# Patient Record
Sex: FEMALE | Race: WHITE | Employment: FULL TIME | URBAN - METROPOLITAN AREA
[De-identification: names, ages, dates, MRNs, and addresses within clinical notes are randomized per-mention and may not be internally consistent; named-entity substitution may affect disease eponyms.]

---

## 2021-04-04 ENCOUNTER — HOSPITAL ENCOUNTER (EMERGENCY)
Facility: CLINIC | Age: 21
Discharge: HOME | End: 2021-04-05
Attending: PHYSICIAN ASSISTANT | Admitting: PHYSICIAN ASSISTANT
Payer: COMMERCIAL

## 2021-04-04 VITALS
OXYGEN SATURATION: 98 % | SYSTOLIC BLOOD PRESSURE: 104 MMHG | RESPIRATION RATE: 18 BRPM | HEART RATE: 68 BPM | DIASTOLIC BLOOD PRESSURE: 48 MMHG | TEMPERATURE: 97 F

## 2021-04-04 DIAGNOSIS — R56.9 WITNESSED SEIZURE-LIKE ACTIVITY (H): ICD-10-CM

## 2021-04-04 DIAGNOSIS — Z86.69 HX OF HYDROCEPHALUS: ICD-10-CM

## 2021-04-04 LAB
ANION GAP SERPL CALCULATED.3IONS-SCNC: 4 MMOL/L (ref 3–14)
B-HCG FREE SERPL-ACNC: <5 IU/L
BASOPHILS # BLD AUTO: 0.1 10E9/L (ref 0–0.2)
BASOPHILS NFR BLD AUTO: 0.6 %
BUN SERPL-MCNC: 15 MG/DL (ref 7–30)
CALCIUM SERPL-MCNC: 8.5 MG/DL (ref 8.5–10.1)
CHLORIDE SERPL-SCNC: 111 MMOL/L (ref 94–109)
CO2 SERPL-SCNC: 25 MMOL/L (ref 20–32)
CREAT SERPL-MCNC: 0.61 MG/DL (ref 0.52–1.04)
DIFFERENTIAL METHOD BLD: NORMAL
EOSINOPHIL # BLD AUTO: 0.1 10E9/L (ref 0–0.7)
EOSINOPHIL NFR BLD AUTO: 1.1 %
ERYTHROCYTE [DISTWIDTH] IN BLOOD BY AUTOMATED COUNT: 12.3 % (ref 10–15)
GFR SERPL CREATININE-BSD FRML MDRD: >90 ML/MIN/{1.73_M2}
GLUCOSE BLDC GLUCOMTR-MCNC: 101 MG/DL (ref 70–99)
GLUCOSE SERPL-MCNC: 99 MG/DL (ref 70–99)
HCT VFR BLD AUTO: 36.9 % (ref 35–47)
HGB BLD-MCNC: 12.3 G/DL (ref 11.7–15.7)
IMM GRANULOCYTES # BLD: 0 10E9/L (ref 0–0.4)
IMM GRANULOCYTES NFR BLD: 0.2 %
LYMPHOCYTES # BLD AUTO: 3.2 10E9/L (ref 0.8–5.3)
LYMPHOCYTES NFR BLD AUTO: 37.1 %
MAGNESIUM SERPL-MCNC: 1.7 MG/DL (ref 1.6–2.3)
MCH RBC QN AUTO: 32.3 PG (ref 26.5–33)
MCHC RBC AUTO-ENTMCNC: 33.3 G/DL (ref 31.5–36.5)
MCV RBC AUTO: 97 FL (ref 78–100)
MONOCYTES # BLD AUTO: 0.8 10E9/L (ref 0–1.3)
MONOCYTES NFR BLD AUTO: 8.6 %
NEUTROPHILS # BLD AUTO: 4.6 10E9/L (ref 1.6–8.3)
NEUTROPHILS NFR BLD AUTO: 52.4 %
NRBC # BLD AUTO: 0 10*3/UL
NRBC BLD AUTO-RTO: 0 /100
PLATELET # BLD AUTO: 257 10E9/L (ref 150–450)
POTASSIUM SERPL-SCNC: 3.6 MMOL/L (ref 3.4–5.3)
RBC # BLD AUTO: 3.81 10E12/L (ref 3.8–5.2)
SODIUM SERPL-SCNC: 140 MMOL/L (ref 133–144)
WBC # BLD AUTO: 8.7 10E9/L (ref 4–11)

## 2021-04-04 PROCEDURE — 85025 COMPLETE CBC W/AUTO DIFF WBC: CPT | Performed by: PHYSICIAN ASSISTANT

## 2021-04-04 PROCEDURE — 93005 ELECTROCARDIOGRAM TRACING: CPT

## 2021-04-04 PROCEDURE — 83735 ASSAY OF MAGNESIUM: CPT | Performed by: PHYSICIAN ASSISTANT

## 2021-04-04 PROCEDURE — 99284 EMERGENCY DEPT VISIT MOD MDM: CPT

## 2021-04-04 PROCEDURE — 999N001017 HC STATISTIC GLUCOSE BY METER IP

## 2021-04-04 PROCEDURE — 80048 BASIC METABOLIC PNL TOTAL CA: CPT | Performed by: PHYSICIAN ASSISTANT

## 2021-04-04 PROCEDURE — 80177 DRUG SCRN QUAN LEVETIRACETAM: CPT | Performed by: PHYSICIAN ASSISTANT

## 2021-04-04 PROCEDURE — 84702 CHORIONIC GONADOTROPIN TEST: CPT

## 2021-04-04 ASSESSMENT — ENCOUNTER SYMPTOMS
CHILLS: 0
TREMORS: 1
DIZZINESS: 1
ABDOMINAL PAIN: 0
NAUSEA: 1
SEIZURES: 1
FEVER: 0

## 2021-04-05 LAB — INTERPRETATION ECG - MUSE: NORMAL

## 2021-04-05 NOTE — ED TRIAGE NOTES
Family reports having seizure like activity that last approx 30-45 min. Now c/o nausea, muscle cramping and fatigue   Hx Hydrocephalus   Prescribed on keppra

## 2021-04-05 NOTE — ED PROVIDER NOTES
History   Chief Complaint:  Seizures     The history is provided by the patient and a friend.      [place] Ontiveros is a 20 year old female who presents with seizures. Patient states she has a history of hydrocephalus with  shunt in place. Patient's friend states she began to have seizure like activity today such as loss of consciousness, tremors in her hands, dizziness, nausea, and cramping which lasted for 30-45 minutes. She did not have any symptoms prior to her seizure. She was laying down and denies falling of head trauma. She took her second dose of 1500 mg of Keppra afterwards but still feels dizziness. She states she was seen approximately a week ago for a seizure in [place] and had her Keppra increased from 1500 to 800 mg. She currently takes 1500 mg x2 daily and has not missed any doses. Patient states she has a history of breakthrough seizures. On her [birth year] last year, she state she had a breakthrough seizure but was conscious for that episode. She complains of continues dizziness but resolves laying still. Her nausea has resolved here. She denies any drug or alcohol use. Denies fevers or chills. Denies abdominal pain. Denies any known pregnancies. She has an EEG coming up in 8 days.      Review of Systems   Constitutional: Negative for chills and fever.   Gastrointestinal: Positive for nausea (resolved). Negative for abdominal pain.   Neurological: Positive for dizziness, tremors (resolved), seizures and syncope.   All other systems reviewed and are negative.      Allergies:  Latex    Medications:  Keppra 1500 mg x2 Daily     Past Medical History:    Hydrocephalus   Breakthrough Seizure    Social History:  Arrives with friend.     Physical Exam     Patient Vitals for the past 24 hrs:   BP Temp Pulse Resp SpO2   04/04/21 2345 104/48 -- 68 -- 98 %   04/04/21 2330 119/64 -- 77 -- 100 %   04/04/21 2315 115/73 -- 81 -- 99 %   04/04/21 2300 121/67 -- 84 -- 100 %   04/04/21 2245 123/61 -- 88 -- 100 %    04/04/21 2230 127/55 -- 80 -- 100 %   04/04/21 2124 (!) 145/74 97  F (36.1  C) 74 18 100 %       Physical Exam  Constitutional: Pleasant. Cooperative.  Eyes: Pupils equally round and reactive  HENT: Head is normal in appearance. Oropharynx is normal with moist mucus membranes.  Cardiovascular: Regular rate and rhythm and without murmurs.  Respiratory: Normal respiratory effort, lungs are clear bilaterally.  GI: Abdomen is soft, non-tender, non-distended. No guarding, rebound, or rigidity.  Skin: Normal, without rash.  Neurologic: Cranial nerves II-XII intact, nl cognition, no focal deficits. Alert and oriented x 3. Normal  strength. Normal leg raise. Sensation to light touch intact throughout all 4 extremities. 5/5 strength with dorsiflexion and plantarflexion bilaterally. No pronator drift. Normal finger nose finger.   Psychiatric: Normal affect.  Nursing notes and vital signs reviewed.    Emergency Department Course   ECG  ECG taken at 2139, ECG read at 2142  Normal sinus rhythm   Normal ECG   Rate 85 bpm. TX interval 134 ms. QRS duration 84 ms. QT/QTc 362/430 ms. P-R-T axes 61 63 29.     Laboratory:  CBC: WBC 8.7, HGB 12.3,    BMP: Chloride 111 (H), o/w WNL (Creatinine: 0.61)    Glucose by meter (Collected 225): 101 (H)     Keppra Level: Pending   Magnesium: 1.7    ISTAT Quantitative Pregnancy POCT: <5.0    Emergency Department Course:    Reviewed:  I reviewed nursing notes and past medical history    Assessments:  2133 I obtained history and examined the patient as noted above.   2345 I rechecked the patient and explained findings.     Disposition:  The patient was discharged to home.     Impression & Plan     Medical Decision Making:  Kaley Ontiveros is a 20 year old female who presents to the ED for evaluation of seizure-like activity. Patient has a history of hydrocephalus with  shunt in place. Patient had breakthrough seizure recently and had her Keppra dosing increased recently. She is from  out of town, however is in the process of obtaining a new neurologist. She does have an EEG scheduled for 8 days from today given her recent breakthrough seizure. See HPI as above for additional details. Vitals and physical exam as above. DDx was broad and included recurrent seizure, CVA, ICH, trauma, tumor, hyponatremia, eclampsia, alcohol withdrawal, hypoglycemia, subtherapeutic AED level, among others. Etiology of patient's breakthrough seizure is unclear at this time. She has started new herbal medications including Cesar's Wort. Neurologic exam is reassuring at this time, doubt other incracranial abnormality. No evidence for hyponatremia. Patient is not pregnant suggesting against eclampsia. Patient denies excessive or recent alcohol use. Patient feeling improving following observation in the ED. Patient ambulated without difficulty in the ED. [place] patient was safe for discharge to home. Did consider  shunt malfunction, however patient does not endorse other symptoms consistent with malfunction, such as vomiting, loss of appetite, lethargy, vision changes, stiff neck, making this less likely. With ongoing recurrent seizures, she may need further work up to rule this out. Discussed reasons to return. All questions answered. Patient discharged to home in stable condition.    Diagnosis:    ICD-10-CM    1. Witnessed seizure-like activity (H)  R56.9    2. Hx of hydrocephalus  Z86.69        Scribe Disclosure:  I, Fazal Christie, am serving as a scribe at 9:27 PM on 4/4/2021 to document services personally performed by Jet Lindquist PA-C based on my observations and the provider's statements to me.     This record was created at least in part using electronic voice recognition software, so please excuse any typographical errors.         Jet Lindquist PA-C  04/05/21 0034

## 2021-04-06 LAB — LEVETIRACETAM SERPL-MCNC: 32 UG/ML (ref 12–46)
